# Patient Record
(demographics unavailable — no encounter records)

---

## 2025-01-28 NOTE — PHYSICAL EXAM
[General Appearance - Alert] : alert [Well-Healed] : well-healed [Oriented To Time, Place, And Person] : oriented to person, place, and time [Person] : oriented to person [Place] : oriented to place [Time] : oriented to time [Motor Tone] : muscle tone was normal in all four extremities [Motor Strength] : muscle strength was normal in all four extremities [Abnormal Walk] : normal gait [Balance] : balance was intact

## 2025-01-31 NOTE — ADDENDUM
[FreeTextEntry1] : The patient is a 44-year-old gentleman with a history of a prior L3-4 and L4-5 microdiscectomy.  He did well initially but then began complaining of severe leg pain on the right side, whereas his initial symptoms were left-sided.  He underwent extensive physical therapy and epidural injections that provided temporary relief of his pain.  He states that the pain is still significant and causes him significant problems with his activities of daily living.  MRI of the lumbar spine shows a recurrent L4-5 disc herniation with significant lateral recess stenosis on the right side.  Given the patient's symptoms, imaging findings and lack of response conservative management, I feel he would benefit from procedure to decompress the neural elements at L4-5 on the right side via a microdiscectomy.  I discussed with him the risks, benefits, alternatives and expected outcomes of the surgery.  The patient understood, all of his questions were answered to his satisfaction he would like to proceed with surgery.  He is given instructions regarding medical pretesting as well as scheduling for the operation.

## 2025-01-31 NOTE — HISTORY OF PRESENT ILLNESS
[< 3 months] : less than 3 months [FreeTextEntry1] : left leg numbness [de-identified] : 44 y/o male presents with left leg numbness and weakness. Approximately 1 month ago patient went to the Emergency Department for lower back pain and left leg numbness and weakness. Since then the lower back pain has resolved but he continues to have numbness of the left leg. Patient has noticed his gait has changed and he finds himself tripping while walking frequently. He is currently in physical therapy with no improvement in the leg numbness and weakness. He is seeing a pain management doctor at [facility] and has an epidural scheduled at this time.   Surgery 03/04/2024  L3/4 and L4/5 Hemilaminotomy and microdiscectomy  Office Visit 03/20/2024 Reports he is doing well. Patient has noticed improvement in numbness and tingling of his lower extremities. No improvement in left drop foot. He is ambulating frequently.  Denies any signs of postop wound infection which could include but is not limited to redness/swelling/purulent drainage Denies CP/SOB/unilateral leg edema. Denies headaches, nausea, vomiting, dizziness, weakness.  Office Visit 04/17/2024 patient reports improvement in left leg pain. He reports little improvement with left foot drop. Is now experiencing right lower extremity pain posterior aspect with prolonged sitting. Incision healing well with no drainage or redness.   Office Visit 05/30/24 patient had colorectal surgery Friday May 24th. Since colorectal surgery reporting worsening right posterior lower extremity pain. Pain exacerbated by prolonged walking or standing. Patient reports some improvement in numbness of the left lower extremity. No improvement in left foot drop. Currently in PT.   Office visit 07/18/2024 patient continues to report right lower extremity pain. Pain has improved with Gabapentin. Patient is currently in PT. Pain exacerbated by prolonged sitting or standing.   Today 01/30/2025 patient comes in with new lumbar MRI. Patient continues to report right lower extremity pain. He states some improvement in the strength of his right leg but pain persists. He received an epidural which alleviated the pain for approximately 1 month.

## 2025-01-31 NOTE — HISTORY OF PRESENT ILLNESS
[< 3 months] : less than 3 months [FreeTextEntry1] : left leg numbness [de-identified] : 42 y/o male presents with left leg numbness and weakness. Approximately 1 month ago patient went to the Emergency Department for lower back pain and left leg numbness and weakness. Since then the lower back pain has resolved but he continues to have numbness of the left leg. Patient has noticed his gait has changed and he finds himself tripping while walking frequently. He is currently in physical therapy with no improvement in the leg numbness and weakness. He is seeing a pain management doctor at [facility] and has an epidural scheduled at this time.   Surgery 03/04/2024  L3/4 and L4/5 Hemilaminotomy and microdiscectomy  Office Visit 03/20/2024 Reports he is doing well. Patient has noticed improvement in numbness and tingling of his lower extremities. No improvement in left drop foot. He is ambulating frequently.  Denies any signs of postop wound infection which could include but is not limited to redness/swelling/purulent drainage Denies CP/SOB/unilateral leg edema. Denies headaches, nausea, vomiting, dizziness, weakness.  Office Visit 04/17/2024 patient reports improvement in left leg pain. He reports little improvement with left foot drop. Is now experiencing right lower extremity pain posterior aspect with prolonged sitting. Incision healing well with no drainage or redness.   Office Visit 05/30/24 patient had colorectal surgery Friday May 24th. Since colorectal surgery reporting worsening right posterior lower extremity pain. Pain exacerbated by prolonged walking or standing. Patient reports some improvement in numbness of the left lower extremity. No improvement in left foot drop. Currently in PT.   Office visit 07/18/2024 patient continues to report right lower extremity pain. Pain has improved with Gabapentin. Patient is currently in PT. Pain exacerbated by prolonged sitting or standing.   Today 01/30/2025 patient comes in with new lumbar MRI. Patient continues to report right lower extremity pain. He states some improvement in the strength of his right leg but pain persists. He received an epidural which alleviated the pain for approximately 1 month.

## 2025-01-31 NOTE — ASSESSMENT
[FreeTextEntry1] : MRI shows L4/5 disc herniation.  L4/5 microdiscectomy recommended.  Risks and benefits discussed.  Pre-op clearance given to the patient. CHG wipes with instructions given to the patient.

## 2025-01-31 NOTE — RESULTS/DATA
[FreeTextEntry1] : EXAM:  MRI LUMBAR SPINE WITHOUT CONTRAST HISTORY:  Low back pain.  TECHNIQUE: Multiplanar, multi-sequential MRI of the lumbar spine was obtained on a 1.2T scanner using a standard protocol. COMPARISON:  None. FINDINGS: Most inferiorly well-formed disc space will be referred to as L5-S1 for purposes of numbering in this report. Vertebral body height is maintained.  Mild lumbar levoscoliosis.  Mild Modic type II endplate degenerative change on the right at L4-5. T12-L1: No disc bulge or herniation. No significant spinal canal or neural foraminal stenosis. L1-L2: No disc bulge or herniation. No significant spinal canal or neural foraminal stenosis. L2-L3: Minimal bulge. No significant spinal canal or neural foraminal stenosis. L3-L4: Disc bulge with moderately sized superimposed central protrusion. Posterior annular fissure. Mild bilateral facet arthrosis. Prior left hemilaminectomy. Mild spinal canal stenosis. Mild left foraminal stenosis. L4-L5: Disc bulge with superimposed moderately sized central protrusion. Prior left hemilaminectomy. Severe bilateral subarticular stenosis. Moderate bilateral foraminal stenosis. L5-S1: Mild narrowing of thecal sac from prominent epidural fat. Disc bulge with mild bilateral facet arthrosis. Moderate bilateral foraminal stenosis. No significant spinal canal stenosis.  IMPRESSION:  1.  L2-L3: Minimal bulge.  2.  L3-L4: Disc bulge with moderately sized superimposed central protrusion. Posterior annular fissure. Mild bilateral facet arthrosis. Prior left hemilaminectomy. Mild spinal canal stenosis. Mild left foraminal stenosis. 3.  L4-L5: Disc bulge with superimposed moderately sized central protrusion. Prior left hemilaminectomy. Severe bilateral subarticular stenosis. Moderate bilateral foraminal stenosis. 4.  L5-S1: Mild narrowing of thecal sac from prominent epidural fat. Disc bulge with mild bilateral facet arthrosis. Moderate bilateral foraminal stenosis.  5.  Mild lumbar levoscoliosis.  6.  Mild Modic type II endplate degenerative change on the right at L4-5.

## 2025-01-31 NOTE — HISTORY OF PRESENT ILLNESS
[< 3 months] : less than 3 months [FreeTextEntry1] : left leg numbness [de-identified] : 44 y/o male presents with left leg numbness and weakness. Approximately 1 month ago patient went to the Emergency Department for lower back pain and left leg numbness and weakness. Since then the lower back pain has resolved but he continues to have numbness of the left leg. Patient has noticed his gait has changed and he finds himself tripping while walking frequently. He is currently in physical therapy with no improvement in the leg numbness and weakness. He is seeing a pain management doctor at [facility] and has an epidural scheduled at this time.   Surgery 03/04/2024  L3/4 and L4/5 Hemilaminotomy and microdiscectomy  Office Visit 03/20/2024 Reports he is doing well. Patient has noticed improvement in numbness and tingling of his lower extremities. No improvement in left drop foot. He is ambulating frequently.  Denies any signs of postop wound infection which could include but is not limited to redness/swelling/purulent drainage Denies CP/SOB/unilateral leg edema. Denies headaches, nausea, vomiting, dizziness, weakness.  Office Visit 04/17/2024 patient reports improvement in left leg pain. He reports little improvement with left foot drop. Is now experiencing right lower extremity pain posterior aspect with prolonged sitting. Incision healing well with no drainage or redness.   Office Visit 05/30/24 patient had colorectal surgery Friday May 24th. Since colorectal surgery reporting worsening right posterior lower extremity pain. Pain exacerbated by prolonged walking or standing. Patient reports some improvement in numbness of the left lower extremity. No improvement in left foot drop. Currently in PT.   Office visit 07/18/2024 patient continues to report right lower extremity pain. Pain has improved with Gabapentin. Patient is currently in PT. Pain exacerbated by prolonged sitting or standing.   Today 01/30/2025 patient comes in with new lumbar MRI. Patient continues to report right lower extremity pain. He states some improvement in the strength of his right leg but pain persists. He received an epidural which alleviated the pain for approximately 1 month.

## 2025-03-21 NOTE — REASON FOR VISIT
[de-identified] : Right-sided L4-5 hemilaminotomy, medial facetectomy, foraminotomy, and microdiskectomy. Use of intraoperative stereotactic navigation. [de-identified] : 03/04/2025

## 2025-03-21 NOTE — PHYSICAL EXAM
[General Appearance - Alert] : alert [Longitudinal] : longitudinal [Clean] : clean [Dry] : dry [Healing Well] : healing well [Intact] : intact [No Drainage] : without drainage [Oriented To Time, Place, And Person] : oriented to person, place, and time [Over the Past 2 Weeks, Have You Felt Down, Depressed, or Hopeless?] : 1.) Over the past 2 weeks, have you felt down, depressed, or hopeless? No [Over the Past 2 Weeks, Have You Felt Little Interest or Pleasure Doing Things?] : 2.) Over the past 2 weeks, have you felt little interest or pleasure doing things? No [Person] : oriented to person [Place] : oriented to place [Time] : oriented to time [Limited Balance] : balance was intact

## 2025-03-21 NOTE — HISTORY OF PRESENT ILLNESS
[< 3 months] : less than 3 months [FreeTextEntry1] : left leg numbness [de-identified] : 42 y/o male presents with left leg numbness and weakness. Approximately 1 month ago patient went to the Emergency Department for lower back pain and left leg numbness and weakness. Since then the lower back pain has resolved but he continues to have numbness of the left leg. Patient has noticed his gait has changed and he finds himself tripping while walking frequently. He is currently in physical therapy with no improvement in the leg numbness and weakness. He is seeing a pain management doctor at [facility] and has an epidural scheduled at this time.   Surgery 03/04/2024  L3/4 and L4/5 Hemilaminotomy and microdiscectomy  Office Visit 03/20/2024 Reports he is doing well. Patient has noticed improvement in numbness and tingling of his lower extremities. No improvement in left drop foot. He is ambulating frequently.  Denies any signs of postop wound infection which could include but is not limited to redness/swelling/purulent drainage Denies CP/SOB/unilateral leg edema. Denies headaches, nausea, vomiting, dizziness, weakness.  Office Visit 04/17/2024 patient reports improvement in left leg pain. He reports little improvement with left foot drop. Is now experiencing right lower extremity pain posterior aspect with prolonged sitting. Incision healing well with no drainage or redness.   Office Visit 05/30/24 patient had colorectal surgery Friday May 24th. Since colorectal surgery reporting worsening right posterior lower extremity pain. Pain exacerbated by prolonged walking or standing. Patient reports some improvement in numbness of the left lower extremity. No improvement in left foot drop. Currently in PT.   Office visit 07/18/2024 patient continues to report right lower extremity pain. Pain has improved with Gabapentin. Patient is currently in PT. Pain exacerbated by prolonged sitting or standing.   Office Visit 01/30/2025 patient comes in with new lumbar MRI. Patient continues to report right lower extremity pain. He states some improvement in the strength of his right leg but pain persists. He received an epidural which alleviated the pain for approximately 1 month.   Surgery 03/04/2025 Right-sided L4-5 hemilaminotomy, medial facetectomy, foraminotomy, and microdiskectomy.  Today 03/21/2025 patient is here for 2 week post-operative appointment. Patient is doing well with minimal right lower extremity pain.

## 2025-03-21 NOTE — ASSESSMENT
[FreeTextEntry1] : Plan: Follow up with Dr. Jon in 4 weeks  Educated on:  No Bend/Lift/Twist Do not lift anything more than 5 pounds for 6 weeks after surgery No strenuous activity for 6 weeks after surgery Do NOT smoke or use nicotine products as it can prevent bony fusion and delay healing Shower daily; use mild soap - pat incision line dry with dry separate towel Do not apply lotions or ointments over incision No tubs, pools for 6-8 weeks Avoid direct sun exposure to incision site for 3-6 months Gradually increase activities as tolerated   Report all s/s infection including drainage, odor, redness, fever greater than 101

## 2025-04-17 NOTE — REASON FOR VISIT
[de-identified] : Right-sided L4-5 hemilaminotomy, medial facetectomy, foraminotomy, and microdiskectomy. Use of intraoperative stereotactic navigation. [de-identified] : 03/04/2025

## 2025-04-17 NOTE — PHYSICAL EXAM
[Over the Past 2 Weeks, Have You Felt Down, Depressed, or Hopeless?] : 1.) Over the past 2 weeks, have you felt down, depressed, or hopeless? No [Over the Past 2 Weeks, Have You Felt Little Interest or Pleasure Doing Things?] : 2.) Over the past 2 weeks, have you felt little interest or pleasure doing things? No [Limited Balance] : balance was intact

## 2025-04-17 NOTE — REASON FOR VISIT
[de-identified] : Right-sided L4-5 hemilaminotomy, medial facetectomy, foraminotomy, and microdiskectomy. Use of intraoperative stereotactic navigation. [de-identified] : 03/04/2025

## 2025-04-17 NOTE — HISTORY OF PRESENT ILLNESS
[FreeTextEntry1] : left leg numbness [de-identified] : 44 y/o male presents with left leg numbness and weakness. Approximately 1 month ago patient went to the Emergency Department for lower back pain and left leg numbness and weakness. Since then the lower back pain has resolved but he continues to have numbness of the left leg. Patient has noticed his gait has changed and he finds himself tripping while walking frequently. He is currently in physical therapy with no improvement in the leg numbness and weakness. He is seeing a pain management doctor at [facility] and has an epidural scheduled at this time.   Surgery 03/04/2024  L3/4 and L4/5 Hemilaminotomy and microdiscectomy  Office Visit 03/20/2024 Reports he is doing well. Patient has noticed improvement in numbness and tingling of his lower extremities. No improvement in left drop foot. He is ambulating frequently.  Denies any signs of postop wound infection which could include but is not limited to redness/swelling/purulent drainage Denies CP/SOB/unilateral leg edema. Denies headaches, nausea, vomiting, dizziness, weakness.  Office Visit 04/17/2024 patient reports improvement in left leg pain. He reports little improvement with left foot drop. Is now experiencing right lower extremity pain posterior aspect with prolonged sitting. Incision healing well with no drainage or redness.   Office Visit 05/30/24 patient had colorectal surgery Friday May 24th. Since colorectal surgery reporting worsening right posterior lower extremity pain. Pain exacerbated by prolonged walking or standing. Patient reports some improvement in numbness of the left lower extremity. No improvement in left foot drop. Currently in PT.   Office visit 07/18/2024 patient continues to report right lower extremity pain. Pain has improved with Gabapentin. Patient is currently in PT. Pain exacerbated by prolonged sitting or standing.   Office Visit 01/30/2025 patient comes in with new lumbar MRI. Patient continues to report right lower extremity pain. He states some improvement in the strength of his right leg but pain persists. He received an epidural which alleviated the pain for approximately 1 month.   Surgery 03/04/2025 Right-sided L4-5 hemilaminotomy, medial facetectomy, foraminotomy, and microdiskectomy.  Office Visit 03/21/2025 patient is here for 2 week post-operative appointment. Patient is doing well with minimal right lower extremity pain.   Today 04/17/2025 patient has been doing well. This week experienced some dull and achy pain in the right leg on pain scale 1/10.

## 2025-04-17 NOTE — ADDENDUM
[FreeTextEntry1] : The patient is doing very well status post revision lumbar discectomy.  He states he has no radicular pain and minimal back pain.  He is eager to start physical therapy.  Patient was given a referral for physical  therapy treatment.  He will follow-up in 6 weeks as per routine.

## 2025-04-17 NOTE — HISTORY OF PRESENT ILLNESS
[FreeTextEntry1] : left leg numbness [de-identified] : 44 y/o male presents with left leg numbness and weakness. Approximately 1 month ago patient went to the Emergency Department for lower back pain and left leg numbness and weakness. Since then the lower back pain has resolved but he continues to have numbness of the left leg. Patient has noticed his gait has changed and he finds himself tripping while walking frequently. He is currently in physical therapy with no improvement in the leg numbness and weakness. He is seeing a pain management doctor at [facility] and has an epidural scheduled at this time.   Surgery 03/04/2024  L3/4 and L4/5 Hemilaminotomy and microdiscectomy  Office Visit 03/20/2024 Reports he is doing well. Patient has noticed improvement in numbness and tingling of his lower extremities. No improvement in left drop foot. He is ambulating frequently.  Denies any signs of postop wound infection which could include but is not limited to redness/swelling/purulent drainage Denies CP/SOB/unilateral leg edema. Denies headaches, nausea, vomiting, dizziness, weakness.  Office Visit 04/17/2024 patient reports improvement in left leg pain. He reports little improvement with left foot drop. Is now experiencing right lower extremity pain posterior aspect with prolonged sitting. Incision healing well with no drainage or redness.   Office Visit 05/30/24 patient had colorectal surgery Friday May 24th. Since colorectal surgery reporting worsening right posterior lower extremity pain. Pain exacerbated by prolonged walking or standing. Patient reports some improvement in numbness of the left lower extremity. No improvement in left foot drop. Currently in PT.   Office visit 07/18/2024 patient continues to report right lower extremity pain. Pain has improved with Gabapentin. Patient is currently in PT. Pain exacerbated by prolonged sitting or standing.   Office Visit 01/30/2025 patient comes in with new lumbar MRI. Patient continues to report right lower extremity pain. He states some improvement in the strength of his right leg but pain persists. He received an epidural which alleviated the pain for approximately 1 month.   Surgery 03/04/2025 Right-sided L4-5 hemilaminotomy, medial facetectomy, foraminotomy, and microdiskectomy.  Office Visit 03/21/2025 patient is here for 2 week post-operative appointment. Patient is doing well with minimal right lower extremity pain.   Today 04/17/2025 patient has been doing well. This week experienced some dull and achy pain in the right leg on pain scale 1/10.

## 2025-06-06 NOTE — REASON FOR VISIT
[de-identified] : Right-sided L4-5 hemilaminotomy, medial facetectomy, foraminotomy, and microdiskectomy. Use of intraoperative stereotactic navigation. [de-identified] : 03/04/2025

## 2025-06-06 NOTE — HISTORY OF PRESENT ILLNESS
[< 3 months] : less than 3 months [FreeTextEntry1] : left leg numbness [de-identified] : 44 y/o male presents with left leg numbness and weakness. Approximately 1 month ago patient went to the Emergency Department for lower back pain and left leg numbness and weakness. Since then the lower back pain has resolved but he continues to have numbness of the left leg. Patient has noticed his gait has changed and he finds himself tripping while walking frequently. He is currently in physical therapy with no improvement in the leg numbness and weakness. He is seeing a pain management doctor at [facility] and has an epidural scheduled at this time.   Surgery 03/04/2024  L3/4 and L4/5 Hemilaminotomy and microdiscectomy  Office Visit 03/20/2024 Reports he is doing well. Patient has noticed improvement in numbness and tingling of his lower extremities. No improvement in left drop foot. He is ambulating frequently.  Denies any signs of postop wound infection which could include but is not limited to redness/swelling/purulent drainage Denies CP/SOB/unilateral leg edema. Denies headaches, nausea, vomiting, dizziness, weakness.  Office Visit 04/17/2024 patient reports improvement in left leg pain. He reports little improvement with left foot drop. Is now experiencing right lower extremity pain posterior aspect with prolonged sitting. Incision healing well with no drainage or redness.   Office Visit 05/30/24 patient had colorectal surgery Friday May 24th. Since colorectal surgery reporting worsening right posterior lower extremity pain. Pain exacerbated by prolonged walking or standing. Patient reports some improvement in numbness of the left lower extremity. No improvement in left foot drop. Currently in PT.   Office visit 07/18/2024 patient continues to report right lower extremity pain. Pain has improved with Gabapentin. Patient is currently in PT. Pain exacerbated by prolonged sitting or standing.   Office Visit 01/30/2025 patient comes in with new lumbar MRI. Patient continues to report right lower extremity pain. He states some improvement in the strength of his right leg but pain persists. He received an epidural which alleviated the pain for approximately 1 month.   Surgery 03/04/2025 Right-sided L4-5 hemilaminotomy, medial facetectomy, foraminotomy, and microdiskectomy.  Office Visit 03/21/2025 patient is here for 2 week post-operative appointment. Patient is doing well with minimal right lower extremity pain.   Office Visit 04/17/2025 patient has been doing well. This week experienced some dull and achy pain in the right leg on pain scale 1/10.   Today 06/05/2025 patient doing very well with significant improvement in leg pain.

## 2025-06-06 NOTE — PHYSICAL EXAM
[General Appearance - Alert] : alert [Longitudinal] : longitudinal [Clean] : clean [Dry] : dry [Healing Well] : healing well [Intact] : intact [No Drainage] : without drainage [Oriented To Time, Place, And Person] : oriented to person, place, and time [Person] : oriented to person [Place] : oriented to place [Time] : oriented to time [Over the Past 2 Weeks, Have You Felt Down, Depressed, or Hopeless?] : 1.) Over the past 2 weeks, have you felt down, depressed, or hopeless? No [Over the Past 2 Weeks, Have You Felt Little Interest or Pleasure Doing Things?] : 2.) Over the past 2 weeks, have you felt little interest or pleasure doing things? No [Limited Balance] : balance was intact

## 2025-06-06 NOTE — HISTORY OF PRESENT ILLNESS
[< 3 months] : less than 3 months [FreeTextEntry1] : left leg numbness [de-identified] : 42 y/o male presents with left leg numbness and weakness. Approximately 1 month ago patient went to the Emergency Department for lower back pain and left leg numbness and weakness. Since then the lower back pain has resolved but he continues to have numbness of the left leg. Patient has noticed his gait has changed and he finds himself tripping while walking frequently. He is currently in physical therapy with no improvement in the leg numbness and weakness. He is seeing a pain management doctor at [facility] and has an epidural scheduled at this time.   Surgery 03/04/2024  L3/4 and L4/5 Hemilaminotomy and microdiscectomy  Office Visit 03/20/2024 Reports he is doing well. Patient has noticed improvement in numbness and tingling of his lower extremities. No improvement in left drop foot. He is ambulating frequently.  Denies any signs of postop wound infection which could include but is not limited to redness/swelling/purulent drainage Denies CP/SOB/unilateral leg edema. Denies headaches, nausea, vomiting, dizziness, weakness.  Office Visit 04/17/2024 patient reports improvement in left leg pain. He reports little improvement with left foot drop. Is now experiencing right lower extremity pain posterior aspect with prolonged sitting. Incision healing well with no drainage or redness.   Office Visit 05/30/24 patient had colorectal surgery Friday May 24th. Since colorectal surgery reporting worsening right posterior lower extremity pain. Pain exacerbated by prolonged walking or standing. Patient reports some improvement in numbness of the left lower extremity. No improvement in left foot drop. Currently in PT.   Office visit 07/18/2024 patient continues to report right lower extremity pain. Pain has improved with Gabapentin. Patient is currently in PT. Pain exacerbated by prolonged sitting or standing.   Office Visit 01/30/2025 patient comes in with new lumbar MRI. Patient continues to report right lower extremity pain. He states some improvement in the strength of his right leg but pain persists. He received an epidural which alleviated the pain for approximately 1 month.   Surgery 03/04/2025 Right-sided L4-5 hemilaminotomy, medial facetectomy, foraminotomy, and microdiskectomy.  Office Visit 03/21/2025 patient is here for 2 week post-operative appointment. Patient is doing well with minimal right lower extremity pain.   Office Visit 04/17/2025 patient has been doing well. This week experienced some dull and achy pain in the right leg on pain scale 1/10.   Today 06/05/2025 patient doing very well with significant improvement in leg pain.

## 2025-06-06 NOTE — REASON FOR VISIT
[de-identified] : Right-sided L4-5 hemilaminotomy, medial facetectomy, foraminotomy, and microdiskectomy. Use of intraoperative stereotactic navigation. [de-identified] : 03/04/2025

## 2025-06-06 NOTE — REASON FOR VISIT
[de-identified] : Right-sided L4-5 hemilaminotomy, medial facetectomy, foraminotomy, and microdiskectomy. Use of intraoperative stereotactic navigation. [de-identified] : 03/04/2025

## 2025-06-06 NOTE — ADDENDUM
[FreeTextEntry1] : The patient is a 44-year-old gentleman who is 6 weeks status post lumbar microdiscectomy.  Overall, he is doing extremely well with resolution of his lower extremity pain.  He has begun physical therapy and is progressing.  At this point, I recommend that the patient continue with his physical therapy.  He will return to the office in 6 more weeks for routine follow-up.  He knows to call the office if he should have any questions, concerns or complaints

## 2025-06-06 NOTE — HISTORY OF PRESENT ILLNESS
[< 3 months] : less than 3 months [FreeTextEntry1] : left leg numbness [de-identified] : 42 y/o male presents with left leg numbness and weakness. Approximately 1 month ago patient went to the Emergency Department for lower back pain and left leg numbness and weakness. Since then the lower back pain has resolved but he continues to have numbness of the left leg. Patient has noticed his gait has changed and he finds himself tripping while walking frequently. He is currently in physical therapy with no improvement in the leg numbness and weakness. He is seeing a pain management doctor at [facility] and has an epidural scheduled at this time.   Surgery 03/04/2024  L3/4 and L4/5 Hemilaminotomy and microdiscectomy  Office Visit 03/20/2024 Reports he is doing well. Patient has noticed improvement in numbness and tingling of his lower extremities. No improvement in left drop foot. He is ambulating frequently.  Denies any signs of postop wound infection which could include but is not limited to redness/swelling/purulent drainage Denies CP/SOB/unilateral leg edema. Denies headaches, nausea, vomiting, dizziness, weakness.  Office Visit 04/17/2024 patient reports improvement in left leg pain. He reports little improvement with left foot drop. Is now experiencing right lower extremity pain posterior aspect with prolonged sitting. Incision healing well with no drainage or redness.   Office Visit 05/30/24 patient had colorectal surgery Friday May 24th. Since colorectal surgery reporting worsening right posterior lower extremity pain. Pain exacerbated by prolonged walking or standing. Patient reports some improvement in numbness of the left lower extremity. No improvement in left foot drop. Currently in PT.   Office visit 07/18/2024 patient continues to report right lower extremity pain. Pain has improved with Gabapentin. Patient is currently in PT. Pain exacerbated by prolonged sitting or standing.   Office Visit 01/30/2025 patient comes in with new lumbar MRI. Patient continues to report right lower extremity pain. He states some improvement in the strength of his right leg but pain persists. He received an epidural which alleviated the pain for approximately 1 month.   Surgery 03/04/2025 Right-sided L4-5 hemilaminotomy, medial facetectomy, foraminotomy, and microdiskectomy.  Office Visit 03/21/2025 patient is here for 2 week post-operative appointment. Patient is doing well with minimal right lower extremity pain.   Office Visit 04/17/2025 patient has been doing well. This week experienced some dull and achy pain in the right leg on pain scale 1/10.   Today 06/05/2025 patient doing very well with significant improvement in leg pain.